# Patient Record
Sex: FEMALE | Race: WHITE | NOT HISPANIC OR LATINO | URBAN - NONMETROPOLITAN AREA
[De-identification: names, ages, dates, MRNs, and addresses within clinical notes are randomized per-mention and may not be internally consistent; named-entity substitution may affect disease eponyms.]

---

## 2022-01-03 ENCOUNTER — TELEPHONE (OUTPATIENT)
Dept: FAMILY MEDICINE CLINIC | Age: 27
End: 2022-01-03

## 2022-01-03 NOTE — TELEPHONE ENCOUNTER
Caller: Eula Reinoso    Relationship to patient: Self    Best call back number:[phone]    Chief complaint:EAR IRRITATION    Type of visit:OFFICE VISIT    Requested date: 01/03/2022    If rescheduling, when is the original appointment: N/A    Additional notes:PATIENT CALLED IN TO SCHEDULE A NEW PATIENT APPOINTMENT.  SHE HAS AN IRRITATED EAR AND A HISTORY OF MRSA.  COULD NOT GET [employer]ANCE RESPONSE.  PLEASE CONTACT PATIENT AND GET HER SCHEDULED ASAP.  ALSO NEEDED TO KNOW WHAT SHE COULD USE AS AN ALTERNATE FOR A PHOTO ID. I WAS UNABLE TO WARM TRANSFER.